# Patient Record
Sex: FEMALE | Race: OTHER
[De-identification: names, ages, dates, MRNs, and addresses within clinical notes are randomized per-mention and may not be internally consistent; named-entity substitution may affect disease eponyms.]

---

## 2017-09-06 ENCOUNTER — HOSPITAL ENCOUNTER (OUTPATIENT)
Dept: HOSPITAL 74 - FMAMMOTONE | Age: 57
Discharge: HOME | End: 2017-09-06
Attending: SURGERY
Payer: COMMERCIAL

## 2017-09-06 DIAGNOSIS — N60.31: Primary | ICD-10-CM

## 2017-09-06 DIAGNOSIS — N64.89: ICD-10-CM

## 2017-09-06 DIAGNOSIS — R92.8: ICD-10-CM

## 2017-09-06 PROCEDURE — A4648 IMPLANTABLE TISSUE MARKER: HCPCS

## 2017-09-06 PROCEDURE — 0HBT3ZX EXCISION OF RIGHT BREAST, PERCUTANEOUS APPROACH, DIAGNOSTIC: ICD-10-PCS | Performed by: SURGERY

## 2017-09-06 NOTE — OP
DATE OF OPERATION:  09/06/2017 

 

PREOPERATIVE DIAGNOSIS:  Abnormal right mammography.

 

POSTOPERATIVE DIAGNOSIS:  Abnormal right mammography.

 

PROCEDURE PERFORMED:  Right stereotactic needle biopsy with clips.

 

SURGEON:  Elizabeth Bowman MD

 

ANESTHESIA:  Local.

 

COMPLICATIONS:  None. 

 

INDICATIONS FOR PROCEDURE:  The patient presented with screening mammogram that noted

a new cluster of microcalcifications of the upper right breast.  My recommendation

was needle biopsy.  

The procedure was discussed including the need for a clip. 

 

PROCEDURE IN DETAIL:  The patient was brought to [facility] at

[place], laid prone on the [place] table.  Using a plain approach calcification in

the upper right breast were identified.  A _____ obtained.  A target was chosen. 

There was a positive.  Using Betadine and 1% lidocaine a 10-gauge _____ device was

used to take several cores from this area.  Cores showed calcification within them. 

These were handled using calcification protocol.  A clip was deployed in the area. 

Hemostasis was assured with direct pressure.   The incision was closed with

Steri-Strips.  The patient tolerated the procedure well and left the breast imaging

center in good condition. 

 

 

ELIZABETH BOWMAN M.D.

 

[id number]

DD: 09/06/2017 10:19

DT: 09/06/2017 11:17

Job #:  [id number]

## 2017-09-07 NOTE — PATH
Surgical Pathology Report



Patient Name:  EARLENE PINO

Accession #:  [id number]

[facility]. Rec. #:  [id number]                                                        

   /Age/Gender:  1960 (Age: 56) / F

Account:  [id number]                                                          

             Location: [facility]

Taken:  2017

Received:  2017

Reported:  2017

Physicians:  Elizabeth Ramirez M.D.

  



Specimen(s) Received

A: RIGHT BREAST SPECIMEN WITH CALCIFICATIONS 

B: RIGHT BREAST SPECIMEN WITHOUT CALCIFICATIONS 





Clinical History

Mammographic findings: Microcalcification, suspicious







Final Diagnosis

A. BREAST, RIGHT, WITH CALCIFICATIONS, STEREOTACTIC BIOPSY:  

BENIGN BREAST TISSUE SHOWING COLUMNAR CELL CHANGES WITH ASSOCIATED

CALCIFICATIONS.

REMAINING BREAST TISSUE SHOWS STROMAL FIBROSIS AND ECTATIC DUCTS WITH PERIDUCTAL

CHRONIC INFLAMMATION.

B. BREAST, RIGHT, WITHOUT CALCIFICATIONS, STEREOTACTIC BIOPSY:  

BENIGN BREAST TISSUE SHOWING STROMAL FIBROSIS AND ECTATIC DUCTS WITH PERIDUCTAL

CHRONIC INFLAMMATION.





***Electronically Signed***

Parris Jensen M.D.





Gross Description

A.  Received in formalin, labeled "right breast with calcifications," are 5

tan-yellow, cylindrical portions of fibroadipose tissue ranging from 0.8-2.0 cm.

in length and averaging 0.3 cm. in diameter. The specimen is submitted in toto

in one cassette. 

B.  Received in formalin, labeled "right breast without calcification," are 6

tan-yellow, cylindrical portions of fibroadipose tissue ranging from 0.6-2.3 cm.

in length and averaging 0.3 cm. in diameter. The specimen is submitted in toto

in one cassette. 

Time to formalin fixation: 6 minutes

Total formalin fixation time: Approximately 8 hours.





[facility]

## 2023-04-05 ENCOUNTER — HOSPITAL ENCOUNTER (INPATIENT)
Dept: HOSPITAL 74 - JER | Age: 63
LOS: 3 days | Discharge: HOME | DRG: 247 | End: 2023-04-08
Attending: INTERNAL MEDICINE | Admitting: HOSPITALIST
Payer: COMMERCIAL

## 2023-04-05 VITALS — BODY MASS INDEX: 34 KG/M2

## 2023-04-05 DIAGNOSIS — B96.20: ICD-10-CM

## 2023-04-05 DIAGNOSIS — F17.210: ICD-10-CM

## 2023-04-05 DIAGNOSIS — N39.0: ICD-10-CM

## 2023-04-05 DIAGNOSIS — R10.12: ICD-10-CM

## 2023-04-05 DIAGNOSIS — I10: ICD-10-CM

## 2023-04-05 DIAGNOSIS — J45.909: ICD-10-CM

## 2023-04-05 DIAGNOSIS — E66.9: ICD-10-CM

## 2023-04-05 DIAGNOSIS — E78.5: ICD-10-CM

## 2023-04-05 DIAGNOSIS — K56.609: Primary | ICD-10-CM

## 2023-04-05 LAB
ALBUMIN SERPL-MCNC: 3.7 G/DL (ref 3.4–5)
ALP SERPL-CCNC: 64 U/L (ref 45–117)
ALT SERPL-CCNC: 24 U/L (ref 13–61)
ANION GAP SERPL CALC-SCNC: 3 MMOL/L (ref 8–16)
APPEARANCE UR: CLEAR
APTT BLD: 31.3 SECONDS (ref 25.2–36.5)
AST SERPL-CCNC: 23 U/L (ref 15–37)
BASOPHILS # BLD: 0.6 % (ref 0–2)
BILIRUB SERPL-MCNC: 0.4 MG/DL (ref 0.2–1)
BILIRUB UR STRIP.AUTO-MCNC: NEGATIVE MG/DL
BUN SERPL-MCNC: 19.4 MG/DL (ref 7–18)
CALCIUM SERPL-MCNC: 10 MG/DL (ref 8.5–10.1)
CHLORIDE SERPL-SCNC: 104 MMOL/L (ref 98–107)
CO2 SERPL-SCNC: 32 MMOL/L (ref 21–32)
COLOR UR: YELLOW
CREAT SERPL-MCNC: 1.3 MG/DL (ref 0.55–1.3)
DEPRECATED RDW RBC AUTO: 14.1 % (ref 11.6–15.6)
EOSINOPHIL # BLD: 3.8 % (ref 0–4.5)
GLUCOSE SERPL-MCNC: 100 MG/DL (ref 74–106)
HCT VFR BLD CALC: 46.4 % (ref 32.4–45.2)
HGB BLD-MCNC: 15.8 GM/DL (ref 10.7–15.3)
INR BLD: 0.88 (ref 0.83–1.09)
KETONES UR QL STRIP: NEGATIVE
LEUKOCYTE ESTERASE UR QL STRIP.AUTO: NEGATIVE
LIPASE SERPL-CCNC: 244 U/L (ref 73–393)
LYMPHOCYTES # BLD: 31.5 % (ref 8–40)
MAGNESIUM SERPL-MCNC: 2.4 MG/DL (ref 1.8–2.4)
MCH RBC QN AUTO: 30.7 PG (ref 25.7–33.7)
MCHC RBC AUTO-ENTMCNC: 34 G/DL (ref 32–36)
MCV RBC: 90.1 FL (ref 80–96)
MONOCYTES # BLD AUTO: 7.6 % (ref 3.8–10.2)
NEUTROPHILS # BLD: 56.5 % (ref 42.8–82.8)
NITRITE UR QL STRIP: NEGATIVE
PH UR: 8 [PH] (ref 5–8)
PLATELET # BLD AUTO: 250 10^3/UL (ref 134–434)
PMV BLD: 9 FL (ref 7.5–11.1)
PROT SERPL-MCNC: 7.4 G/DL (ref 6.4–8.2)
PROT UR QL STRIP: NEGATIVE
PROT UR QL STRIP: NEGATIVE
PT PNL PPP: 10.2 SEC (ref 9.7–13)
RBC # BLD AUTO: 5.15 M/MM3 (ref 3.6–5.2)
SODIUM SERPL-SCNC: 140 MMOL/L (ref 136–145)
SP GR UR: 1.01 (ref 1.01–1.03)
UROBILINOGEN UR STRIP-MCNC: 0.2 MG/DL (ref 0.2–1)
WBC # BLD AUTO: 7.8 K/MM3 (ref 4–10)

## 2023-04-06 LAB
ALBUMIN SERPL-MCNC: 2.7 G/DL (ref 3.4–5)
ALP SERPL-CCNC: 56 U/L (ref 45–117)
ALT SERPL-CCNC: 20 U/L (ref 13–61)
ANION GAP SERPL CALC-SCNC: 2 MMOL/L (ref 8–16)
APTT BLD: 28.8 SECONDS (ref 25.2–36.5)
AST SERPL-CCNC: 15 U/L (ref 15–37)
BASOPHILS # BLD: 0.7 % (ref 0–2)
BILIRUB SERPL-MCNC: 0.3 MG/DL (ref 0.2–1)
BUN SERPL-MCNC: 22.9 MG/DL (ref 7–18)
CALCIUM SERPL-MCNC: 8.3 MG/DL (ref 8.5–10.1)
CHLORIDE SERPL-SCNC: 113 MMOL/L (ref 98–107)
CO2 SERPL-SCNC: 26 MMOL/L (ref 21–32)
CREAT SERPL-MCNC: 1.2 MG/DL (ref 0.55–1.3)
DEPRECATED RDW RBC AUTO: 14.1 % (ref 11.6–15.6)
EOSINOPHIL # BLD: 4.5 % (ref 0–4.5)
GLUCOSE SERPL-MCNC: 94 MG/DL (ref 74–106)
HCT VFR BLD CALC: 39.3 % (ref 32.4–45.2)
HGB BLD-MCNC: 13.3 GM/DL (ref 10.7–15.3)
INR BLD: 0.9 (ref 0.83–1.09)
LYMPHOCYTES # BLD: 33.3 % (ref 8–40)
MAGNESIUM SERPL-MCNC: 2.4 MG/DL (ref 1.8–2.4)
MCH RBC QN AUTO: 30.7 PG (ref 25.7–33.7)
MCHC RBC AUTO-ENTMCNC: 33.9 G/DL (ref 32–36)
MCV RBC: 90.5 FL (ref 80–96)
MONOCYTES # BLD AUTO: 8.1 % (ref 3.8–10.2)
NEUTROPHILS # BLD: 53.4 % (ref 42.8–82.8)
PHOSPHATE SERPL-MCNC: 4 MG/DL (ref 2.5–4.9)
PLATELET # BLD AUTO: 204 10^3/UL (ref 134–434)
PMV BLD: 8.8 FL (ref 7.5–11.1)
PROT SERPL-MCNC: 5.6 G/DL (ref 6.4–8.2)
PT PNL PPP: 10.5 SEC (ref 9.7–13)
RBC # BLD AUTO: 4.34 M/MM3 (ref 3.6–5.2)
SODIUM SERPL-SCNC: 142 MMOL/L (ref 136–145)
WBC # BLD AUTO: 6.6 K/MM3 (ref 4–10)

## 2023-04-06 RX ADMIN — ACETAMINOPHEN SCH MG: 10 INJECTION, SOLUTION INTRAVENOUS at 18:40

## 2023-04-06 RX ADMIN — SODIUM CHLORIDE, POTASSIUM CHLORIDE, SODIUM LACTATE AND CALCIUM CHLORIDE SCH MLS/HR: 600; 310; 30; 20 INJECTION, SOLUTION INTRAVENOUS at 05:00

## 2023-04-06 RX ADMIN — LISINOPRIL SCH MG: 20 TABLET ORAL at 10:03

## 2023-04-07 LAB
ALBUMIN SERPL-MCNC: 3.4 G/DL (ref 3.4–5)
ALP SERPL-CCNC: 85 U/L (ref 45–117)
ALT SERPL-CCNC: 79 U/L (ref 13–61)
ANION GAP SERPL CALC-SCNC: 5 MMOL/L (ref 8–16)
AST SERPL-CCNC: 78 U/L (ref 15–37)
BASOPHILS # BLD: 0.6 % (ref 0–2)
BILIRUB SERPL-MCNC: 0.6 MG/DL (ref 0.2–1)
BUN SERPL-MCNC: 15.4 MG/DL (ref 7–18)
CALCIUM SERPL-MCNC: 8.8 MG/DL (ref 8.5–10.1)
CHLORIDE SERPL-SCNC: 111 MMOL/L (ref 98–107)
CO2 SERPL-SCNC: 27 MMOL/L (ref 21–32)
CREAT SERPL-MCNC: 1.2 MG/DL (ref 0.55–1.3)
DEPRECATED RDW RBC AUTO: 13.9 % (ref 11.6–15.6)
EOSINOPHIL # BLD: 2.5 % (ref 0–4.5)
GLUCOSE SERPL-MCNC: 74 MG/DL (ref 74–106)
HCT VFR BLD CALC: 44.4 % (ref 32.4–45.2)
HGB BLD-MCNC: 14.8 GM/DL (ref 10.7–15.3)
LYMPHOCYTES # BLD: 22.3 % (ref 8–40)
MAGNESIUM SERPL-MCNC: 2.3 MG/DL (ref 1.8–2.4)
MCH RBC QN AUTO: 30.3 PG (ref 25.7–33.7)
MCHC RBC AUTO-ENTMCNC: 33.4 G/DL (ref 32–36)
MCV RBC: 90.6 FL (ref 80–96)
MONOCYTES # BLD AUTO: 5.9 % (ref 3.8–10.2)
NEUTROPHILS # BLD: 68.7 % (ref 42.8–82.8)
PHOSPHATE SERPL-MCNC: 3 MG/DL (ref 2.5–4.9)
PLATELET # BLD AUTO: 219 10^3/UL (ref 134–434)
PMV BLD: 9.1 FL (ref 7.5–11.1)
PROT SERPL-MCNC: 6.9 G/DL (ref 6.4–8.2)
RBC # BLD AUTO: 4.91 M/MM3 (ref 3.6–5.2)
SODIUM SERPL-SCNC: 143 MMOL/L (ref 136–145)
WBC # BLD AUTO: 5.5 K/MM3 (ref 4–10)

## 2023-04-07 RX ADMIN — LISINOPRIL SCH MG: 20 TABLET ORAL at 10:38

## 2023-04-07 RX ADMIN — ACETAMINOPHEN SCH MG: 10 INJECTION, SOLUTION INTRAVENOUS at 02:31

## 2023-04-07 RX ADMIN — SODIUM CHLORIDE, POTASSIUM CHLORIDE, SODIUM LACTATE AND CALCIUM CHLORIDE SCH: 600; 310; 30; 20 INJECTION, SOLUTION INTRAVENOUS at 23:32

## 2023-04-07 RX ADMIN — ACETAMINOPHEN SCH MG: 10 INJECTION, SOLUTION INTRAVENOUS at 10:40

## 2023-04-07 RX ADMIN — ACETAMINOPHEN SCH MG: 10 INJECTION, SOLUTION INTRAVENOUS at 17:53

## 2023-04-08 VITALS
HEART RATE: 66 BPM | SYSTOLIC BLOOD PRESSURE: 151 MMHG | RESPIRATION RATE: 20 BRPM | TEMPERATURE: 98.2 F | DIASTOLIC BLOOD PRESSURE: 90 MMHG

## 2023-04-08 RX ADMIN — LISINOPRIL SCH MG: 20 TABLET ORAL at 09:22

## 2023-04-08 RX ADMIN — ACETAMINOPHEN SCH: 10 INJECTION, SOLUTION INTRAVENOUS at 04:34

## 2023-04-08 RX ADMIN — ACETAMINOPHEN SCH: 10 INJECTION, SOLUTION INTRAVENOUS at 09:23
